# Patient Record
Sex: MALE | Race: WHITE | NOT HISPANIC OR LATINO
[De-identification: names, ages, dates, MRNs, and addresses within clinical notes are randomized per-mention and may not be internally consistent; named-entity substitution may affect disease eponyms.]

---

## 2018-01-01 ENCOUNTER — TRANSCRIPTION ENCOUNTER (OUTPATIENT)
Age: 63
End: 2018-01-01

## 2018-12-24 ENCOUNTER — TRANSCRIPTION ENCOUNTER (OUTPATIENT)
Age: 63
End: 2018-12-24

## 2019-01-27 PROBLEM — Z00.00 ENCOUNTER FOR PREVENTIVE HEALTH EXAMINATION: Status: ACTIVE | Noted: 2019-01-27

## 2019-02-25 ENCOUNTER — APPOINTMENT (OUTPATIENT)
Dept: UROLOGY | Facility: CLINIC | Age: 64
End: 2019-02-25
Payer: COMMERCIAL

## 2019-02-25 VITALS
HEIGHT: 69 IN | BODY MASS INDEX: 38.51 KG/M2 | SYSTOLIC BLOOD PRESSURE: 133 MMHG | WEIGHT: 260 LBS | DIASTOLIC BLOOD PRESSURE: 85 MMHG | HEART RATE: 101 BPM

## 2019-02-25 DIAGNOSIS — Z78.9 OTHER SPECIFIED HEALTH STATUS: ICD-10-CM

## 2019-02-25 DIAGNOSIS — Z80.42 FAMILY HISTORY OF MALIGNANT NEOPLASM OF PROSTATE: ICD-10-CM

## 2019-02-25 DIAGNOSIS — I10 ESSENTIAL (PRIMARY) HYPERTENSION: ICD-10-CM

## 2019-02-25 PROCEDURE — 99203 OFFICE O/P NEW LOW 30 MIN: CPT

## 2019-02-25 NOTE — HISTORY OF PRESENT ILLNESS
[Nocturia] : nocturia [Weak Stream] : weak stream [Erectile Dysfunction] : Erectile Dysfunction [None] : None [FreeTextEntry1] : Vinnie is a 64-year-old male, with a history of elevated PSA, status post prostate biopsy 5 years ago, that as per patient was benign. He also has a history of renal stones treated in the past the shockwave lithotripsy, who was sent for consultation has he has developed worsening urinary symptoms.\par \par He states that  he has been experiencing two or more episodes of nocturia per night with decreased force of urinary stream x4 years. He has a history of sleep apnea and understands how this could contribute to his nocturia. He denies frequency/urgency throughout the day.\par \par Additionally, he is complaining of erectile dysfunction with difficulty obtaining/maintaining an erection, worsening over the past year. He is also experiencing decreased libido/energy.\par He took a Viagra, which [place], which he states helped to achieve an erection however, it gave him a significant headache and wants to know if there is anything else he can use.\par \par Despite the history of elevated PSA status post prostate biopsy 5 years ago, which was negative for malignancy, he has not had a PSA over 2 years. Of note, he has a significant family history of prostate cancer in his father, diagnosed at age 75.\par \par He has a history of a renal stone status post ESWL 7 years ago. Denies passing any stones in the past and denies having any episodes of renal colic.\par \par His IPSS is 5, Representing mild symptoms, though he is mostly unsatisfied with the way he is voiding.\par His IIEF is 19, Representing mild erectile dysfunction.\par  [Urinary Incontinence] : no urinary incontinence [Urinary Retention] : no urinary retention [Urinary Urgency] : no urinary urgency [Urinary Frequency] : no urinary frequency [Straining] : no straining [Intermittency] : no intermittency [Post-Void Dribbling] : no post-void dribbling [Dysuria] : no dysuria [Hematuria - Gross] : no gross hematuria [Hematuria - Microscopic] : no microscopic hematuria [Bladder Spasm] : no bladder spasm [Abdominal Pain] : no abdominal pain [Flank Pain] : no flank pain

## 2019-02-25 NOTE — PHYSICAL EXAM
[General Appearance - Well Developed] : well developed [General Appearance - Well Nourished] : well nourished [Normal Appearance] : normal appearance [Well Groomed] : well groomed [General Appearance - In No Acute Distress] : no acute distress [Edema] : no peripheral edema [Respiration, Rhythm And Depth] : normal respiratory rhythm and effort [Exaggerated Use Of Accessory Muscles For Inspiration] : no accessory muscle use [Abdomen Soft] : soft [Abdomen Tenderness] : non-tender [Costovertebral Angle Tenderness] : no ~M costovertebral angle tenderness [Urethral Meatus] : meatus normal [Penis Abnormality] : normal circumcised penis [Urinary Bladder Findings] : the bladder was normal on palpation [Scrotum] : the scrotum was normal [Testes Mass (___cm)] : there were no testicular masses [Anus Abnormality] : the anus and perineum were normal [Rectal Exam - Rectum] : digital rectal exam was normal [Prostate Enlargement] : the prostate was not enlarged [Prostate Tenderness] : the prostate was not tender [Prostate Size ___ gm] : prostate size [unfilled] gm [Normal Station and Gait] : the gait and station were normal for the patient's age [] : no rash [No Focal Deficits] : no focal deficits [Oriented To Time, Place, And Person] : oriented to person, place, and time [Affect] : the affect was normal [Mood] : the mood was normal [Not Anxious] : not anxious [Femoral Lymph Nodes Enlarged Bilaterally] : femoral [Inguinal Lymph Nodes Enlarged Bilaterally] : inguinal [Heart Rate And Rhythm] : Heart rate and rhythm were normal [Auscultation Breath Sounds / Voice Sounds] : lungs were clear to auscultation bilaterally [Abdomen Hernia] : no hernia was discovered [Skin Color & Pigmentation] : normal skin color and pigmentation [FreeTextEntry1] : DTR's & BC reflexes were intact

## 2019-02-25 NOTE — LETTER HEADER
[FreeTextEntry3] : Kim Fuller M.D.\par Director of Urology\par [facility]\par [address] [place] [address], [address]\[place] [address]

## 2019-02-28 ENCOUNTER — FORM ENCOUNTER (OUTPATIENT)
Age: 64
End: 2019-02-28

## 2019-03-01 ENCOUNTER — OUTPATIENT (OUTPATIENT)
Dept: OUTPATIENT SERVICES | Facility: HOSPITAL | Age: 64
LOS: 1 days | Discharge: HOME | End: 2019-03-01

## 2019-03-01 DIAGNOSIS — N52.9 MALE ERECTILE DYSFUNCTION, UNSPECIFIED: ICD-10-CM

## 2019-03-01 DIAGNOSIS — N20.0 CALCULUS OF KIDNEY: ICD-10-CM

## 2019-03-01 DIAGNOSIS — R68.82 DECREASED LIBIDO: ICD-10-CM

## 2019-04-22 ENCOUNTER — APPOINTMENT (OUTPATIENT)
Dept: UROLOGY | Facility: CLINIC | Age: 64
End: 2019-04-22
Payer: COMMERCIAL

## 2019-04-22 DIAGNOSIS — N20.0 CALCULUS OF KIDNEY: ICD-10-CM

## 2019-04-22 DIAGNOSIS — R97.20 BENIGN PROSTATIC HYPERPLASIA WITHOUT LOWER URINARY TRACT SYMPMS: ICD-10-CM

## 2019-04-22 DIAGNOSIS — N40.0 BENIGN PROSTATIC HYPERPLASIA WITHOUT LOWER URINARY TRACT SYMPMS: ICD-10-CM

## 2019-04-22 LAB
BILIRUB UR QL STRIP: NORMAL
CLARITY UR: CLEAR
COLLECTION METHOD: NORMAL
GLUCOSE UR-MCNC: NORMAL
HCG UR QL: NORMAL EU/DL
HGB UR QL STRIP.AUTO: NORMAL
KETONES UR-MCNC: NORMAL
LEUKOCYTE ESTERASE UR QL STRIP: NORMAL
NITRITE UR QL STRIP: NORMAL
PH UR STRIP: 7
PROT UR STRIP-MCNC: NORMAL
SP GR UR STRIP: 1.01

## 2019-04-22 PROCEDURE — 51741 ELECTRO-UROFLOWMETRY FIRST: CPT

## 2019-04-22 PROCEDURE — 81003 URINALYSIS AUTO W/O SCOPE: CPT | Mod: QW

## 2019-04-22 PROCEDURE — 99214 OFFICE O/P EST MOD 30 MIN: CPT | Mod: 25

## 2019-04-22 PROCEDURE — 51798 US URINE CAPACITY MEASURE: CPT

## 2019-04-22 NOTE — ASSESSMENT
[FreeTextEntry1] : With respect to his ED low libido he did not get the [place] criteria yet. Laboratory studies showed normal liver function test. The hemoglobin was 14.6 with the platelet count of 230 5K. The testosterone total was okay at 418 but that’s due to an upper normal sex hormone binding globulin at 53 with the free and bioavailable testosterone were quite low at 37.1 and 69.8. The prolactin was okay at 6.5 and the lab did not run LH or estradiol. The urine analysis was negative with a pH of seven is to the gravity of 1.020 the urine culture showed less than 10,000 CFU consistent with skin contaminant.\par \par With respect to his hormones were going to have to repeat it as [employer] stone accept just one value and I think that’s appropriate. What we run at this time he will get an estradiol and an LH. He will need his [place] criteria classification before we can help his erections but if his testosterone is indeed low and it depends on his estradiol then we may be able to treated with aromatase inhibitors or he may need testosterone bees via Clomid or exogenous testosterone. If we make him eugonadal and his erections don’t return then if his [place] criteria classification is equal to “N” we will discuss PDE five inhibitors or other means of regaining erections.\par \par With respect to his voiding it appears to be obstructed, I will have him start on Flomax watching for the various side effects including but not limited to asthenia, and ejaculation, migraines etc. He will wait six weeks staying on the medication repeat the record of his intake and output and then come in for review possibly a repeat flow study\par Finally with respect to his stone disease or history thereof there is no history of the stone or obstruction in either kidney\par

## 2019-04-22 NOTE — PHYSICAL EXAM
[General Appearance - Well Developed] : well developed [General Appearance - Well Nourished] : well nourished [Normal Appearance] : normal appearance [Well Groomed] : well groomed [General Appearance - In No Acute Distress] : no acute distress [FreeTextEntry1] : obesity [] : no respiratory distress [Respiration, Rhythm And Depth] : normal respiratory rhythm and effort [Exaggerated Use Of Accessory Muscles For Inspiration] : no accessory muscle use [Oriented To Time, Place, And Person] : oriented to person, place, and time [Affect] : the affect was normal [Mood] : the mood was normal [Not Anxious] : not anxious [Normal Station and Gait] : the gait and station were normal for the patient's age

## 2019-04-22 NOTE — LETTER HEADER
[FreeTextEntry3] : Kim Fuller M.D.\par Director of Urology\par [facility]\par [address] [place] [address], [address]\[place] [address]

## 2019-04-22 NOTE — REVIEW OF SYSTEMS
[see HPI] : see HPI [Joint Pain] : joint pain [Erectile Dysfunction] : erectile dysfunction [Negative] : Heme/Lymph

## 2019-04-22 NOTE — LETTER BODY
[Dear  ___] : Dear  [unfilled], [Courtesy Letter:] : I had the pleasure of seeing your patient, [unfilled], in my office today. [Please see my note below.] : Please see my note below. [Sincerely,] : Sincerely, [FreeTextEntry2] : Narayan Toussaint M.D.\par [address]\[place] [address]\par

## 2019-04-22 NOTE — HISTORY OF PRESENT ILLNESS
[Urinary Urgency] : urinary urgency [Urinary Frequency] : urinary frequency [Nocturia] : nocturia [Weak Stream] : weak stream [Erectile Dysfunction] : Erectile Dysfunction [FreeTextEntry1] : Vinnie was seen on February 25 of the history as above. We did not know if his sleep apnea was affecting his urination so he asked him to keep a record of his intake and output, we sent urine off for analysis, we sent him for a renal and bladder ultrasound we will consider a flow study.\par \par With respect to his erectile dysfunction and PSA but decided to get hormones study and to get [place] criteria classification. Depending on the results we would consider what to do further with respect to that. With respect to the PSA we said that we would repeatable we can the hormone studies and then we can decide whether anything needs to be done.\par \par He came in today for review.\par

## 2019-06-24 ENCOUNTER — APPOINTMENT (OUTPATIENT)
Dept: UROLOGY | Facility: CLINIC | Age: 64
End: 2019-06-24
Payer: COMMERCIAL

## 2019-06-24 DIAGNOSIS — R68.82 DECREASED LIBIDO: ICD-10-CM

## 2019-06-24 DIAGNOSIS — R79.89 OTHER SPECIFIED ABNORMAL FINDINGS OF BLOOD CHEMISTRY: ICD-10-CM

## 2019-06-24 DIAGNOSIS — N52.9 MALE ERECTILE DYSFUNCTION, UNSPECIFIED: ICD-10-CM

## 2019-06-24 DIAGNOSIS — R35.1 NOCTURIA: ICD-10-CM

## 2019-06-24 PROCEDURE — 99214 OFFICE O/P EST MOD 30 MIN: CPT | Mod: 25

## 2019-06-24 PROCEDURE — 51741 ELECTRO-UROFLOWMETRY FIRST: CPT

## 2019-06-24 PROCEDURE — 51798 US URINE CAPACITY MEASURE: CPT

## 2019-06-24 NOTE — ASSESSMENT
[FreeTextEntry1] : He says he is voiding well on tamsulosin, and his parameters have improved. He is electing to continue. Prostate is 38 G and he does nto qualify for 4 BARBRA at this time. He is not interested in minimally invasive prostate procedures at this time. We will continue observation.\par \par Concerning his erectile dysfunction, his hormones are indeed low after 2 consecutive blood work, and he is electing to begin AndroGel 2 pumps per day. He will get blood work in 3 weeks and follow up in 4 weeks. Risks benefits and alternatives were discussed at length including ways to avoid transference\par \par He will see cardiology for further clarification of his [place] criteria.\par

## 2019-06-24 NOTE — LETTER BODY
[Dear  ___] : Dear  [unfilled], [Courtesy Letter:] : I had the pleasure of seeing your patient, [unfilled], in my office today. [Please see my note below.] : Please see my note below. [Sincerely,] : Sincerely, [FreeTextEntry2] : Narayan Toussaint M.D.\par [address]\[place] [address]\par

## 2019-06-24 NOTE — PHYSICAL EXAM
[Edema] : no peripheral edema [Abdomen Soft] : soft [Abdomen Tenderness] : non-tender [Costovertebral Angle Tenderness] : no ~M costovertebral angle tenderness [No Focal Deficits] : no focal deficits [General Appearance - Well Developed] : well developed [General Appearance - Well Nourished] : well nourished [Normal Appearance] : normal appearance [Well Groomed] : well groomed [General Appearance - In No Acute Distress] : no acute distress [FreeTextEntry1] : obesity [] : no respiratory distress [Respiration, Rhythm And Depth] : normal respiratory rhythm and effort [Exaggerated Use Of Accessory Muscles For Inspiration] : no accessory muscle use [Oriented To Time, Place, And Person] : oriented to person, place, and time [Affect] : the affect was normal [Mood] : the mood was normal [Not Anxious] : not anxious [Normal Station and Gait] : the gait and station were normal for the patient's age

## 2019-06-24 NOTE — HISTORY OF PRESENT ILLNESS
[FreeTextEntry1] : Vinnie is a 64-year-old male who had been following for ED, decreased testosterone levels, and minimally bothersome voiding symptoms.\par \par Has last visit, his prostate measured 38 g per ultrasound and his voiding diary and a uroflow study suggested poor flow. We recommended starting tamsulosin p.o. q. daily. He reports improvement in his voiding symptoms and is happy with this medication. He presents today to review his repeat voiding diary and possible uroflow study on tamsulosin.\par \par Concerning his erectile dysfunction, he had poor hormone he elected to repeat those values for consideration of testosterone replacement therapy. Additionally he obtained [place] criteria and brought it in for review.\par  [Urinary Urgency] : urinary urgency [Urinary Frequency] : urinary frequency [Nocturia] : nocturia [Weak Stream] : weak stream [Erectile Dysfunction] : Erectile Dysfunction

## 2019-06-24 NOTE — LETTER HEADER
[FreeTextEntry3] : Kim Fuller M.D.\par Director of Urology\par [facility]\par [address] [place] [address], [address]\[place] [address]

## 2019-06-24 NOTE — LETTER HEADER
[FreeTextEntry3] : Kim Fuller M.D.\par Director of Urology\par [facility]\par [address] [place] [address], [address]\[place] [address]

## 2019-06-24 NOTE — HISTORY OF PRESENT ILLNESS
[FreeTextEntry1] : Vinnie is a 64-year-old male who had been following for ED, decreased testosterone levels, and minimally bothersome voiding symptoms.\par \par Has last visit, his prostate measured 38 g per ultrasound and his voiding diary and a uroflow study suggested poor flow. We recommended starting tamsulosin p.o. q. daily. He reports improvement in his voiding symptoms and is happy with this medication. He presents today to review his repeat voiding diary and possible uroflow study on tamsulosin.\par \par Concerning his erectile dysfunction, he had poor hormone he elected to repeat those values for consideration of testosterone replacement therapy. Additionally he obtained [place] criteria and brought it in for review.\par  [Urinary Urgency] : urinary urgency [Urinary Frequency] : urinary frequency [Nocturia] : nocturia [Weak Stream] : weak stream [Erectile Dysfunction] : Erectile Dysfunction

## 2019-06-24 NOTE — LETTER BODY
[Dear  ___] : Dear  [unfilled], [Courtesy Letter:] : I had the pleasure of seeing your patient, [unfilled], in my office today. [Please see my note below.] : Please see my note below. [Sincerely,] : Sincerely, [FreeTextEntry2] : Narayan Toussaint M.D.\par [address]\[place] [address]\par

## 2019-06-24 NOTE — PHYSICAL EXAM
[Edema] : no peripheral edema [Abdomen Tenderness] : non-tender [Abdomen Soft] : soft [Costovertebral Angle Tenderness] : no ~M costovertebral angle tenderness [No Focal Deficits] : no focal deficits [General Appearance - Well Developed] : well developed [General Appearance - Well Nourished] : well nourished [Normal Appearance] : normal appearance [Well Groomed] : well groomed [General Appearance - In No Acute Distress] : no acute distress [FreeTextEntry1] : obesity [] : no respiratory distress [Respiration, Rhythm And Depth] : normal respiratory rhythm and effort [Exaggerated Use Of Accessory Muscles For Inspiration] : no accessory muscle use [Oriented To Time, Place, And Person] : oriented to person, place, and time [Affect] : the affect was normal [Mood] : the mood was normal [Not Anxious] : not anxious [Normal Station and Gait] : the gait and station were normal for the patient's age

## 2019-06-24 NOTE — ASSESSMENT
[FreeTextEntry1] : He says he is voiding well on tamsulosin, and his parameters have improved. He is electing to continue. Prostate is 38 G and he does nto qualify for 4 BARBRA at this time. He is not interested in minimally invasive prostate procedures at this time. We will continue observation.\par \par Concerning his erectile dysfunction, his hormones are indeed low after 2 consecutive blood work, and he is electing to begin AndroGel 2 pumps per day. He will get blood work in 3 weeks and follow up in 4 weeks. Risks benefits and alternatives were discussed at length including ways to avoid transference\par \par He will see cardiology for further clarification of his [place] criteria.\par

## 2019-07-10 ENCOUNTER — OUTPATIENT (OUTPATIENT)
Dept: OUTPATIENT SERVICES | Facility: HOSPITAL | Age: 64
LOS: 1 days | Discharge: HOME | End: 2019-07-10
Payer: COMMERCIAL

## 2019-07-10 VITALS
HEIGHT: 69 IN | RESPIRATION RATE: 16 BRPM | SYSTOLIC BLOOD PRESSURE: 114 MMHG | DIASTOLIC BLOOD PRESSURE: 77 MMHG | HEART RATE: 80 BPM | TEMPERATURE: 98 F | WEIGHT: 233.91 LBS | OXYGEN SATURATION: 98 %

## 2019-07-10 DIAGNOSIS — Z01.818 ENCOUNTER FOR OTHER PREPROCEDURAL EXAMINATION: ICD-10-CM

## 2019-07-10 DIAGNOSIS — Z98.1 ARTHRODESIS STATUS: Chronic | ICD-10-CM

## 2019-07-10 DIAGNOSIS — Z98.890 OTHER SPECIFIED POSTPROCEDURAL STATES: Chronic | ICD-10-CM

## 2019-07-10 DIAGNOSIS — S83.241D OTHER TEAR OF MEDIAL MENISCUS, CURRENT INJURY, RIGHT KNEE, SUBSEQUENT ENCOUNTER: ICD-10-CM

## 2019-07-10 LAB
ALBUMIN SERPL ELPH-MCNC: 4 G/DL — SIGNIFICANT CHANGE UP (ref 3.5–5.2)
ALP SERPL-CCNC: 78 U/L — SIGNIFICANT CHANGE UP (ref 30–115)
ALT FLD-CCNC: 18 U/L — SIGNIFICANT CHANGE UP (ref 0–41)
ANION GAP SERPL CALC-SCNC: 11 MMOL/L — SIGNIFICANT CHANGE UP (ref 7–14)
AST SERPL-CCNC: 16 U/L — SIGNIFICANT CHANGE UP (ref 0–41)
BILIRUB SERPL-MCNC: 0.5 MG/DL — SIGNIFICANT CHANGE UP (ref 0.2–1.2)
BUN SERPL-MCNC: 25 MG/DL — HIGH (ref 10–20)
CALCIUM SERPL-MCNC: 9.7 MG/DL — SIGNIFICANT CHANGE UP (ref 8.5–10.1)
CHLORIDE SERPL-SCNC: 99 MMOL/L — SIGNIFICANT CHANGE UP (ref 98–110)
CO2 SERPL-SCNC: 30 MMOL/L — SIGNIFICANT CHANGE UP (ref 17–32)
CREAT SERPL-MCNC: 1.3 MG/DL — SIGNIFICANT CHANGE UP (ref 0.7–1.5)
GLUCOSE SERPL-MCNC: 102 MG/DL — HIGH (ref 70–99)
HCT VFR BLD CALC: 43.9 % — SIGNIFICANT CHANGE UP (ref 42–52)
HGB BLD-MCNC: 14.4 G/DL — SIGNIFICANT CHANGE UP (ref 14–18)
MCHC RBC-ENTMCNC: 30.2 PG — SIGNIFICANT CHANGE UP (ref 27–31)
MCHC RBC-ENTMCNC: 32.8 G/DL — SIGNIFICANT CHANGE UP (ref 32–37)
MCV RBC AUTO: 92 FL — SIGNIFICANT CHANGE UP (ref 80–94)
NRBC # BLD: 0 /100 WBCS — SIGNIFICANT CHANGE UP (ref 0–0)
PLATELET # BLD AUTO: 257 K/UL — SIGNIFICANT CHANGE UP (ref 130–400)
POTASSIUM SERPL-MCNC: 3.6 MMOL/L — SIGNIFICANT CHANGE UP (ref 3.5–5)
POTASSIUM SERPL-SCNC: 3.6 MMOL/L — SIGNIFICANT CHANGE UP (ref 3.5–5)
PROT SERPL-MCNC: 7.1 G/DL — SIGNIFICANT CHANGE UP (ref 6–8)
RBC # BLD: 4.77 M/UL — SIGNIFICANT CHANGE UP (ref 4.7–6.1)
RBC # FLD: 13.3 % — SIGNIFICANT CHANGE UP (ref 11.5–14.5)
SODIUM SERPL-SCNC: 140 MMOL/L — SIGNIFICANT CHANGE UP (ref 135–146)
WBC # BLD: 8.97 K/UL — SIGNIFICANT CHANGE UP (ref 4.8–10.8)
WBC # FLD AUTO: 8.97 K/UL — SIGNIFICANT CHANGE UP (ref 4.8–10.8)

## 2019-07-10 PROCEDURE — 93010 ELECTROCARDIOGRAM REPORT: CPT

## 2019-07-10 NOTE — H&P PST ADULT - NSICDXPASTMEDICALHX_GEN_ALL_CORE_FT
PAST MEDICAL HISTORY:  BPH (benign prostatic hyperplasia)     HTN (hypertension)     OA (osteoarthritis)     Obesity     Vertigo

## 2019-07-10 NOTE — H&P PST ADULT - NSANTHOSAYNRD_GEN_A_CORE
No. STACIA screening performed.  STOP BANG Legend: 0-2 = LOW Risk; 3-4 = INTERMEDIATE Risk; 5-8 = HIGH Risk

## 2019-07-18 NOTE — ASU PATIENT PROFILE, ADULT - PREVIOUS BLOOD TRANSFUSION?
Taking Your Blood Pressure  Blood pressure is the force of blood against the artery wall as it moves from the heart through the blood vessels. You can take your own blood pressure reading using a digital monitor. Take your readings the same each time, using the same arm. Take readings as often as your healthcare provider instructs.  About blood pressure monitors  Blood pressure monitors are designed for certain ages and cases. You can find monitors for older adults, for pregnant women, and for children. Make sure the one you choose is the right one for your age and situation.  The American Heart Association recommends an automatic cuff monitor that fits on your upper arm (bicep). The cuff should fit your arm size. A cuff thats too large or too small will not give an accurate reading. Measure around your upper arm to find your size.  Monitors that attach to your finger or wrist are not as accurate as monitors for your upper arm.  Ask your healthcare provider for help in choosing a monitor. Bring your monitor to your next provider visit if you need help in using it the correct way.  The steps below are general instructions for using an automatic digital monitor.  Step 1. Relax    · Take your blood pressure at the same time every day, such as in the morning or evening, or at the time your healthcare provider recommends.  · Wait at least a half-hour after smoking, eating, or exercising. Don't drink coffee, tea, soda, or other caffeinated beverages before checking your blood pressure.  · Sit comfortably at a table with both feet on the floor. Do not cross your legs or feet. Place the monitor near you.  · Rest for a few minutes before you begin.  Step 2. Wrap the cuff    · Place your arm on the table, palm up. Your arm should be at the level of your heart. Wrap the cuff around your upper arm, just above your elbow. Its best done on bare skin, not over clothing. Most cuffs will indicate where the brachial artery (the  blood vessel in the middle of the arm at the inner side of the elbow) should line up with the cuff. Look in your monitor's instruction booklet for an illustration. You can also bring your cuff to your healthcare provider and have them show you how to correctly place the cuff.  Step 3. Inflate the cuff    · Push the button that starts the pump.  · The cuff will tighten, then loosen.  · The numbers will change. When they stop changing, your blood pressure reading will appear.  · Take 2 or 3 readings one minute apart.  Step 4. Write down the results of each reading    · Write down your blood pressure numbers for each reading. Note the date and time. Keep your results in one place, such as a notebook. Even if your monitor has a built-in memory, keep a hard copy of the readings.  · Remove the cuff from your arm. Turn off the machine.  · Bring your blood pressure records with your healthcare providers at each visit.  · If you start a new blood pressure medicine, note the day you started the new medicine. Also note the day if you change the dose of your medicine. This information goes on your blood pressure recording sheet. This will help your healthcare provider monitor how well the medicine changes are working.  · Ask your healthcare provider what numbers should prompt you to call him or her. Also ask what numbers should prompt you to get help right away.  Date Last Reviewed: 11/1/2016  © 0675-7619 [employer]. [address] [place] [address], [place] [address]. All rights reserved. This information is not intended as a substitute for professional medical care. Always follow your healthcare professional's instructions.         no

## 2019-07-18 NOTE — ASU PATIENT PROFILE, ADULT - PMH
BPH (benign prostatic hyperplasia)    HTN (hypertension)    OA (osteoarthritis)    Obesity    Vertigo

## 2019-07-19 ENCOUNTER — OUTPATIENT (OUTPATIENT)
Dept: OUTPATIENT SERVICES | Facility: HOSPITAL | Age: 64
LOS: 1 days | Discharge: HOME | End: 2019-07-19
Payer: COMMERCIAL

## 2019-07-19 ENCOUNTER — RESULT REVIEW (OUTPATIENT)
Age: 64
End: 2019-07-19

## 2019-07-19 VITALS
RESPIRATION RATE: 12 BRPM | HEART RATE: 67 BPM | DIASTOLIC BLOOD PRESSURE: 87 MMHG | SYSTOLIC BLOOD PRESSURE: 135 MMHG | OXYGEN SATURATION: 97 %

## 2019-07-19 VITALS
OXYGEN SATURATION: 96 % | DIASTOLIC BLOOD PRESSURE: 81 MMHG | SYSTOLIC BLOOD PRESSURE: 140 MMHG | HEART RATE: 68 BPM | RESPIRATION RATE: 18 BRPM | TEMPERATURE: 99 F | HEIGHT: 69 IN | WEIGHT: 233.91 LBS

## 2019-07-19 DIAGNOSIS — Z98.890 OTHER SPECIFIED POSTPROCEDURAL STATES: Chronic | ICD-10-CM

## 2019-07-19 DIAGNOSIS — Z98.1 ARTHRODESIS STATUS: Chronic | ICD-10-CM

## 2019-07-19 PROCEDURE — 88304 TISSUE EXAM BY PATHOLOGIST: CPT | Mod: 26

## 2019-07-19 RX ORDER — OXYCODONE AND ACETAMINOPHEN 5; 325 MG/1; MG/1
1 TABLET ORAL ONCE
Refills: 0 | Status: DISCONTINUED | OUTPATIENT
Start: 2019-07-19 | End: 2019-07-19

## 2019-07-19 RX ORDER — HYDROMORPHONE HYDROCHLORIDE 2 MG/ML
0.5 INJECTION INTRAMUSCULAR; INTRAVENOUS; SUBCUTANEOUS ONCE
Refills: 0 | Status: DISCONTINUED | OUTPATIENT
Start: 2019-07-19 | End: 2019-07-19

## 2019-07-19 RX ORDER — SODIUM CHLORIDE 9 MG/ML
1000 INJECTION, SOLUTION INTRAVENOUS
Refills: 0 | Status: DISCONTINUED | OUTPATIENT
Start: 2019-07-19 | End: 2019-08-09

## 2019-07-19 RX ORDER — MORPHINE SULFATE 50 MG/1
2 CAPSULE, EXTENDED RELEASE ORAL
Refills: 0 | Status: DISCONTINUED | OUTPATIENT
Start: 2019-07-19 | End: 2019-07-19

## 2019-07-19 RX ORDER — ONDANSETRON 8 MG/1
4 TABLET, FILM COATED ORAL ONCE
Refills: 0 | Status: DISCONTINUED | OUTPATIENT
Start: 2019-07-19 | End: 2019-08-09

## 2019-07-19 RX ADMIN — HYDROMORPHONE HYDROCHLORIDE 0.5 MILLIGRAM(S): 2 INJECTION INTRAMUSCULAR; INTRAVENOUS; SUBCUTANEOUS at 08:46

## 2019-07-19 RX ADMIN — HYDROMORPHONE HYDROCHLORIDE 0.5 MILLIGRAM(S): 2 INJECTION INTRAMUSCULAR; INTRAVENOUS; SUBCUTANEOUS at 09:28

## 2019-07-19 RX ADMIN — SODIUM CHLORIDE 100 MILLILITER(S): 9 INJECTION, SOLUTION INTRAVENOUS at 08:44

## 2019-07-19 NOTE — CHART NOTE - NSCHARTNOTEFT_GEN_A_CORE
PACU ANESTHESIA ADMISSION NOTE      Procedure: Arthroscopic surgical removal of medial meniscus of right knee    Post op diagnosis:  Medial meniscus tear    ____  Intubated  TV:______       Rate: ______      FiO2: ______    ___x_  Patent Airway    ____x  Full return of protective reflexes    __x__  Full recovery from anesthesia / back to baseline     Vitals:   T:   97        R:        15          BP:  129/72                Sat:   100                P: 67      Mental Status:  ___x_ Awake   _____ Alert   _____ Drowsy   _____ Sedated    Nausea/Vomiting:  __x__ NO  ______Yes,   See Post - Op Orders          Pain Scale (0-10):  _____    Treatment: ___x_ None    ____ See Post - Op/PCA Orders    Post - Operative Fluids:   ____ Oral   __x__ See Post - Op Orders    Plan: Discharge:   ___x  _Home       _____Floor     _____Critical Care    _____  Other:_________________    Comments:

## 2019-07-19 NOTE — BRIEF OPERATIVE NOTE - NSICDXBRIEFPOSTOP_GEN_ALL_CORE_FT
POST-OP DIAGNOSIS:  Medial meniscus, posterior horn derangement 19-Jul-2019 08:22:44  Bassam Samayoa

## 2019-07-19 NOTE — ASU DISCHARGE PLAN (ADULT/PEDIATRIC) - CARE PROVIDER_API CALL
Shashank Holliday (MD)  Orthopaedic Surgery  [address] [place] [address]  Phone: [phone]  Fax: [phone]  Follow Up Time: 1 week

## 2019-07-19 NOTE — BRIEF OPERATIVE NOTE - NSICDXBRIEFPREOP_GEN_ALL_CORE_FT
PRE-OP DIAGNOSIS:  Derangement of posterior horn of medial meniscus, right 19-Jul-2019 08:22:28  Bassam Samayoa

## 2019-07-19 NOTE — ASU DISCHARGE PLAN (ADULT/PEDIATRIC) - CALL YOUR DOCTOR IF YOU HAVE ANY OF THE FOLLOWING:
Wound/Surgical Site with redness, or foul smelling discharge or pus/Nausea and vomiting that does not stop

## 2019-07-22 DIAGNOSIS — M23.231 DERANGEMENT OF OTHER MEDIAL MENISCUS DUE TO OLD TEAR OR INJURY, RIGHT KNEE: ICD-10-CM

## 2019-07-22 LAB — SURGICAL PATHOLOGY STUDY: SIGNIFICANT CHANGE UP

## 2019-07-25 ENCOUNTER — APPOINTMENT (OUTPATIENT)
Dept: UROLOGY | Facility: CLINIC | Age: 64
End: 2019-07-25

## 2022-06-15 NOTE — H&P PST ADULT - HISTORY OF PRESENT ILLNESS
· 64 year ols male here for right knee surgery, states " it just crept up on me and got worse and worse"\  fos=1 limited by knee  denies chest pain sob palp  denies recent uri or uti

## 2022-10-25 NOTE — ASU PATIENT PROFILE, ADULT - PAIN, CHRONIC: FACTORS THAT AGGRAVATE, PROFILE
----- Message from Ilsa JENKINS  79  sent at 10/25/2022  1:19 PM EDT -----  Please let patient know that his Renal US is unchanged when compared to prior imaging studies  There is scarring throughout the left kidney consistent with his partial left nephrectomy  Patient should be scheduled for follow-up as he has not been seen in 2 years and has cancelled his most recent appointments  activity

## 2022-12-23 PROBLEM — R42 DIZZINESS AND GIDDINESS: Chronic | Status: ACTIVE | Noted: 2019-07-10

## 2022-12-23 PROBLEM — I10 ESSENTIAL (PRIMARY) HYPERTENSION: Chronic | Status: ACTIVE | Noted: 2019-07-10

## 2022-12-23 PROBLEM — M19.90 UNSPECIFIED OSTEOARTHRITIS, UNSPECIFIED SITE: Chronic | Status: ACTIVE | Noted: 2019-07-10

## 2022-12-23 PROBLEM — E66.9 OBESITY, UNSPECIFIED: Chronic | Status: ACTIVE | Noted: 2019-07-10

## 2022-12-23 PROBLEM — N40.0 BENIGN PROSTATIC HYPERPLASIA WITHOUT LOWER URINARY TRACT SYMPTOMS: Chronic | Status: ACTIVE | Noted: 2019-07-10

## 2022-12-28 ENCOUNTER — OUTPATIENT (OUTPATIENT)
Dept: OUTPATIENT SERVICES | Facility: HOSPITAL | Age: 67
LOS: 1 days | Discharge: HOME | End: 2022-12-28
Payer: MEDICARE

## 2022-12-28 ENCOUNTER — TRANSCRIPTION ENCOUNTER (OUTPATIENT)
Age: 67
End: 2022-12-28

## 2022-12-28 VITALS
WEIGHT: 229.94 LBS | HEIGHT: 69 IN | SYSTOLIC BLOOD PRESSURE: 118 MMHG | HEART RATE: 103 BPM | RESPIRATION RATE: 20 BRPM | TEMPERATURE: 98 F | DIASTOLIC BLOOD PRESSURE: 82 MMHG

## 2022-12-28 VITALS
DIASTOLIC BLOOD PRESSURE: 66 MMHG | HEART RATE: 88 BPM | SYSTOLIC BLOOD PRESSURE: 141 MMHG | RESPIRATION RATE: 18 BRPM | OXYGEN SATURATION: 95 %

## 2022-12-28 DIAGNOSIS — Z98.1 ARTHRODESIS STATUS: Chronic | ICD-10-CM

## 2022-12-28 DIAGNOSIS — Z98.890 OTHER SPECIFIED POSTPROCEDURAL STATES: Chronic | ICD-10-CM

## 2022-12-28 PROCEDURE — 88305 TISSUE EXAM BY PATHOLOGIST: CPT | Mod: 26

## 2022-12-28 PROCEDURE — 88312 SPECIAL STAINS GROUP 1: CPT | Mod: 26

## 2022-12-28 RX ORDER — MULTIVIT-MIN/FERROUS GLUCONATE 9 MG/15 ML
1 LIQUID (ML) ORAL
Qty: 0 | Refills: 0 | DISCHARGE

## 2022-12-28 RX ORDER — PANTOPRAZOLE SODIUM 20 MG/1
0 TABLET, DELAYED RELEASE ORAL
Qty: 0 | Refills: 0 | DISCHARGE

## 2022-12-28 RX ORDER — HYDROCHLOROTHIAZIDE 25 MG
1 TABLET ORAL
Qty: 0 | Refills: 0 | DISCHARGE

## 2022-12-28 RX ORDER — LOSARTAN/HYDROCHLOROTHIAZIDE 100MG-25MG
1 TABLET ORAL
Qty: 0 | Refills: 0 | DISCHARGE

## 2022-12-28 NOTE — CHART NOTE - NSCHARTNOTEFT_GEN_A_CORE
PACU ANESTHESIA ADMISSION NOTE      Procedure:   Post op diagnosis:      ____  Intubated  TV:______       Rate: ______      FiO2: ______    ____  Patent Airway    ____  Full return of protective reflexes    ___x_  Full recovery from anesthesia / back to baseline     Vitals:   T:   97.1        R:    18              BP:    120/76              Sat:      95             P: 97%      Mental Status:  ___x_ Awake   _____ Alert   _____ Drowsy   _____ Sedated    Nausea/Vomiting:  _x___ NO  ______Yes,   See Post - Op Orders          Pain Scale (0-10):  ___0__    Treatment: ____ None    ____ See Post - Op/PCA Orders    Post - Operative Fluids:   ____ Oral   _x___ See Post - Op Orders    Plan: Discharge:   _x___Home       _____Floor     _____Critical Care    _____  Other:_________________    Comments: PACU ANESTHESIA ADMISSION NOTE      Procedure: Upper Endoscopy  Post op diagnosis:  Gastritis    ____  Intubated  TV:______       Rate: ______      FiO2: ______    ____  Patent Airway    ____  Full return of protective reflexes    ___x_  Full recovery from anesthesia / back to baseline     Vitals:   T:   97.1        R:    18              BP:    120/76              Sat:      95             P: 97%      Mental Status:  ___x_ Awake   _____ Alert   _____ Drowsy   _____ Sedated    Nausea/Vomiting:  _x___ NO  ______Yes,   See Post - Op Orders          Pain Scale (0-10):  ___0__    Treatment: ____ None    ____ See Post - Op/PCA Orders    Post - Operative Fluids:   ____ Oral   _x___ See Post - Op Orders    Plan: Discharge:   _x___Home       _____Floor     _____Critical Care    _____  Other:_________________    Comments:

## 2022-12-28 NOTE — ASU DISCHARGE PLAN (ADULT/PEDIATRIC) - NS MD DC FALL RISK RISK
For information on Fall & Injury Prevention, visit: [email or web address] OR  [email or web address] OR  https://www.cdc.gov/steadi/patient.html

## 2022-12-28 NOTE — ASU PATIENT PROFILE, ADULT - NSICDXPASTMEDICALHX_GEN_ALL_CORE_FT
PAST MEDICAL HISTORY:  Back pain     BPH (benign prostatic hyperplasia)     HTN (hypertension)     OA (osteoarthritis)     Obesity     Vertigo

## 2022-12-28 NOTE — ASU PATIENT PROFILE, ADULT - MEDICATIONS BROUGHT TO HOSPITAL, PROFILE
PRINCIPAL DISCHARGE DIAGNOSIS  Diagnosis: Acute gout of left hand, unspecified cause  Assessment and Plan of Treatment: You presented with worsening pain of your left hand with swelling and decreased strength of the left hand. Your Xray shows inflammatory arthritis type changes consistent with your history of rheumatoid arthritis, and on exam you left hand was swollen and appeared to be warm to touch. Given your kidney dysfunction we cannot give you any NSAIDs, allopurinol, or colchicine but we started you on steroids. You felt better after your steroids. We believe that your flare was most likely in the setting of you eating red meat this past week, please continue to be careful about your diet and try to limit alcohol and red meats, high salt snacks and foods to limit your gout flares. Continue to take your prednisone as written. Please be careful with regards to the prednisone, it can cause your stomach to be upset, it can cause high blood pressure, it can cause some swelling of the legs as well. If you are experiencing any of these symptoms please call your PCP or if they are causing you great distress like shortness of breath, nausea, vomiting, fevers, unremitting abdominal pain, dark colored stools or bright red colored stools please come to the hospital as these may be signs that you require urgent re-evaluation.      SECONDARY DISCHARGE DIAGNOSES  Diagnosis: Acute kidney injury  Assessment and Plan of Treatment: Your kidney function over the past couple of months has gotten progressively worse. Your nephrologist (kidney) doctor wanted for you to get a renal biopsy as an outpatient to determine why your renal function is getting worse. Here in the hospital your renal function has been stable and we are still planning on you getting  a renal biopsy as an outpatient as well and ask that you hold your aspirin until the procedure, and once the procedure is done you can restart your aspirin. In addition, continue taking your eliquis for now and when advised by your nephrologists please stop taking your eliquis for the potential renal biopsy.     yes

## 2022-12-28 NOTE — PRE-ANESTHESIA EVALUATION ADULT - NSANTHBMIRD_ENT_A_CORE
Medicare Wellness Visit  Plan for Preventive Care    A good way for you to stay healthy is to use preventive care.  Medicare covers many services that can help you stay healthy.* The goal of these services is to find any health problems as quickly as possible. Finding problems early can help make them easier to treat.  Your personal plan below lists the services you may need and when they are due.     Health Maintenance Summary     Shingles Vaccine (1 of 2)  Overdue since 3/11/2002    Influenza Vaccine (1)  Overdue since 9/1/2019    Colorectal Cancer Screening-Colonoscopy (Every 5 Years)  Next due on 4/17/2020    Medicare Wellness 65+ (Yearly)  Next due on 10/10/2020    Depression Screening (Yearly)  Next due on 10/10/2020    DTaP/Tdap/Td Vaccine (2 - Td)  Next due on 3/20/2022    Abdominal Aortic Aneurysm (AAA) Screening   Completed    Hepatitis C Screening   Completed    Pneumococcal Vaccine 65+   Completed    Meningococcal Vaccine   Aged Out           Preventive Care for Women and Men    Heart Screenings (Cardiovascular):  · Blood tests are used to check your cholesterol, lipid and triglyceride levels. High levels can increase your risk for heart disease and stroke. High levels can be treated with medications, diet and exercise. Lowering your levels can help keep your heart and blood vessels healthy.  Your provider will order these tests if they are needed.    · An ultrasound is done to see if you have an abdominal aortic aneurysm (AAA).  This is an enlargement of one of the main blood vessels that delivers blood to the body.   In the [place], 9,000 deaths are caused by AAA.  You may not even know you have this problem and as many as 1 in 3 people will have a serious problem if it is not treated.  Early diagnosis allows for more effective treatment and cure.  If you have a family history of AAA or are a male age 65-75 who has smoked, you are at higher risk of an AAA.  Your provider can order this test, if  needed.    Colorectal Screening:  · There are many tests that are used to check for cancer of your colon and rectum. You and your provider should discuss what test is best for you and when to have it done.  Options include:  · Screening Colonoscopy: exam of the entire colon, seen through a flexible lighted tube.  · Flexible Sigmoidoscopy: exam of the last third (sigmoid portion) of the colon and rectum, seen through a flexible lighted tube.  · Cologuard DNA stool test: a sample of your stool is used to screen for cancer and unseen blood in your stool.  · Fecal Occult Blood Test: a sample of your stool is studied to find any unseen blood    Flu Shot:  · An immunization that helps to prevent influenza (the flu). You should get this every year. The best time to get the shot is in the fall.    Pneumococcal Shot:  • Vaccines are available that can help prevent pneumococcal disease, which is any type of infection caused by Streptococcus pneumoniae bacteria.   Their use can prevent some cases of pneumonia, meningitis, and sepsis. There are two types of pneumococcal vaccines:   o Conjugate vaccines (PCV-13 or Prevnar 13®) - helps protect against the 13 types of pneumococcal bacteria that are the most common causes of serious infections in children and adults.    o Polysaccharide vaccine (PPSV23 or Fdfmbfbrq11®) - helps protect against 23 types of pneumococcal bacteria for patients who are recommended to get it.  These vaccines should be given at least 12 months apart.  A booster is usually not needed.     Hepatitis B Shot:  · An immunization that helps to protect people from getting Hepatitis B. Hepatitis B is a virus that spreads through contact with infected blood or body fluids. Many people with the virus do not have symptoms.  The virus can lead to serious problems, such as liver disease. Some people are at higher risk than others. Your doctor will tell you if you need this shot.     Diabetes Screening:  · A test to  measure sugar (glucose) in your blood is called a fasting blood sugar. Fasting means you cannot have food or drink for at least 8 hours before the test. This test can detect diabetes long before you may notice symptoms.    Glaucoma Screening:  · Glaucoma screening is performed by your eye doctor. The test measures the fluid pressure inside your eyes to determine if you have glaucoma.     Hepatitis C Screening:  · A blood test to see if you have the hepatitis C virus.  Hepatitis C attacks the liver and is a major cause of chronic liver disease.  Medicare will cover a single screening for all adults born between 1945 & 1965, or high risk patients (people who have injected illegal drugs or people who have had blood transfusions).  High risk patients who continue to inject illegal drugs can be screened for Hepatitis C every year.    Smoking and Tobacco-Use Cessation Counseling:  · Tobacco is the single greatest cause of disease and early death in our country today. Medication and counseling together can increase a person’s chance of quitting for good.   · Medicare covers two quitting attempts per year, with four counseling sessions per attempt (eight sessions in a 12 month period)    Preventive Screening tests for Women    Screening Mammograms and Breast Exams:  · An x-ray of your breasts to check for breast cancer before you or your doctor may be able to feel it.  If breast cancer is found early it can usually be treated with success.    Pelvic Exams and Pap Tests:  · An exam to check for cervical and vaginal cancer. A Pap test is a lab test in which cells are taken from your cervix and sent to the lab to look for signs of cervical cancer. If cancer of the cervix is found early, chances for a cure are good. Testing can generally end at age 65, or if a woman has a hysterectomy for a benign condition. Your provider may recommend more frequent testing if certain abnormal results are found.    Bone Mass Measurements:  · A  painless x-ray of your bone density to see if you are at risk for a broken bone. Bone density refers to the thickness of bones or how tightly the bone tissue is packed.    Preventive Screening tests for Men    Prostate Screening:  · Should you have a prostate cancer test (PSA)?  It is up to you to decide if you want a prostate cancer test. Talk to your clinician to find out if the test is right for you.  Things for you to consider and talk about should include:  · Benefits and harms of the test  · Your family history  · How your race/ethnicity may influence the test  · If the test may impact other medical conditions you have  · Your values on screenings and treatments    *Medicare pays for many preventive services to keep you healthy. For some of these services, you might have to pay a deductible, coinsurance, and / or copayment.  The amounts vary depending on the type of services you need and the kind of Medicare health plan you have.               Yes

## 2022-12-29 LAB — SURGICAL PATHOLOGY STUDY: SIGNIFICANT CHANGE UP

## 2023-01-02 DIAGNOSIS — I10 ESSENTIAL (PRIMARY) HYPERTENSION: ICD-10-CM

## 2023-01-02 DIAGNOSIS — K21.9 GASTRO-ESOPHAGEAL REFLUX DISEASE WITHOUT ESOPHAGITIS: ICD-10-CM

## 2023-01-02 DIAGNOSIS — K92.1 MELENA: ICD-10-CM

## 2023-01-02 DIAGNOSIS — E66.9 OBESITY, UNSPECIFIED: ICD-10-CM

## 2023-01-02 DIAGNOSIS — Z88.2 ALLERGY STATUS TO SULFONAMIDES: ICD-10-CM

## 2023-01-02 DIAGNOSIS — K29.81 DUODENITIS WITH BLEEDING: ICD-10-CM

## 2023-01-02 DIAGNOSIS — K31.89 OTHER DISEASES OF STOMACH AND DUODENUM: ICD-10-CM

## 2023-01-02 DIAGNOSIS — M19.90 UNSPECIFIED OSTEOARTHRITIS, UNSPECIFIED SITE: ICD-10-CM

## 2023-01-02 DIAGNOSIS — Z98.1 ARTHRODESIS STATUS: ICD-10-CM

## 2023-02-08 ENCOUNTER — APPOINTMENT (OUTPATIENT)
Dept: CARDIOLOGY | Facility: CLINIC | Age: 68
End: 2023-02-08

## 2023-05-23 NOTE — PRE-ANESTHESIA EVALUATION ADULT - NSPREOPDXFT_GEN_ALL_CORE
right knee torn meniscus General Sunscreen Counseling: I recommended a broad spectrum sunscreen with a SPF of 30 or higher.  I explained that SPF 30 sunscreens block approximately 97 percent of the sun's harmful rays.  Sunscreens should be applied at least 15 minutes prior to expected sun exposure and then every 2 hours after that as long as sun exposure continues. If swimming or exercising sunscreen should be reapplied every 45 minutes to an hour after getting wet or sweating.  One ounce, or the equivalent of a shot glass full of sunscreen, is adequate to protect the skin not covered by a bathing suit. I also recommended a lip balm with a sunscreen as well. Sun protective clothing can be used in lieu of sunscreen but must be worn the entire time you are exposed to the sun's rays. Detail Level: Detailed

## 2023-12-04 PROBLEM — M54.9 DORSALGIA, UNSPECIFIED: Chronic | Status: ACTIVE | Noted: 2022-12-28

## 2024-02-16 ENCOUNTER — APPOINTMENT (OUTPATIENT)
Dept: ORTHOPEDIC SURGERY | Facility: CLINIC | Age: 69
End: 2024-02-16
Payer: MEDICARE

## 2024-02-16 VITALS — WEIGHT: 257 LBS | HEIGHT: 69 IN | BODY MASS INDEX: 38.06 KG/M2

## 2024-02-16 DIAGNOSIS — M17.11 UNILATERAL PRIMARY OSTEOARTHRITIS, RIGHT KNEE: ICD-10-CM

## 2024-02-16 PROCEDURE — 73560 X-RAY EXAM OF KNEE 1 OR 2: CPT | Mod: 50

## 2024-02-16 PROCEDURE — 99203 OFFICE O/P NEW LOW 30 MIN: CPT

## 2024-02-16 PROCEDURE — 73502 X-RAY EXAM HIP UNI 2-3 VIEWS: CPT

## 2024-02-16 NOTE — HISTORY OF PRESENT ILLNESS
[de-identified] : Right hip and knee pain  History:  Pain activity related, localized to the joint, partially improved with rest. ADL's noted to be increasingly difficult without use of modifying therapy.   Medical History: Past Medical History is unchanged, all medical issues and medications are stable.  Review of systems is negative for fevers, chills, chest pain, shortness of breath, unexplained weight fluctuations, GI or  symptoms.  Smoking history and social habits are unchanged.   Exam: pain with PROM, limited by guading, no gross instability, strength normal, NVID.   Imaging: X-rays were reviewed with the patient.   AP and Lateral views were obtained of the hips, including the contralateral side for comparison purposes. X-rays are negative for acute bone or soft tissue trauma.  Imaging: X-rays were reviewed with the patient.   AP and Lateral views were obtained of the knees, including the contralateral side for comparison purposes. X-rays are negative for acute bone or soft tissue trauma. Moderate OA right knee  Impression: osteoarthritis right knee  Plan: Plan is for non op management to include rest, activity modification, therapeutic exercises, stretching program, use of nsaids and analgesics, and weight control.

## 2024-05-13 ENCOUNTER — APPOINTMENT (OUTPATIENT)
Dept: CARDIOLOGY | Facility: CLINIC | Age: 69
End: 2024-05-13
Payer: MEDICARE

## 2024-05-13 VITALS
HEART RATE: 81 BPM | BODY MASS INDEX: 37.33 KG/M2 | HEIGHT: 69 IN | WEIGHT: 252 LBS | DIASTOLIC BLOOD PRESSURE: 80 MMHG | SYSTOLIC BLOOD PRESSURE: 118 MMHG

## 2024-05-13 LAB
ANION GAP SERPL CALC-SCNC: 12 MMOL/L
BASOPHILS # BLD AUTO: 0.05 K/UL
BASOPHILS NFR BLD AUTO: 0.4 %
BUN SERPL-MCNC: 23 MG/DL
CALCIUM SERPL-MCNC: 9.3 MG/DL
CHLORIDE SERPL-SCNC: 104 MMOL/L
CHOLEST SERPL-MCNC: 209 MG/DL
CO2 SERPL-SCNC: 24 MMOL/L
CREAT SERPL-MCNC: 1.1 MG/DL
EGFR: 73 ML/MIN/1.73M2
EOSINOPHIL # BLD AUTO: 0.29 K/UL
EOSINOPHIL NFR BLD AUTO: 2.4 %
ESTIMATED AVERAGE GLUCOSE: 134 MG/DL
GLUCOSE SERPL-MCNC: 103 MG/DL
HBA1C MFR BLD HPLC: 6.3 %
HCT VFR BLD CALC: 46 %
HDLC SERPL-MCNC: 56 MG/DL
HGB BLD-MCNC: 15 G/DL
IMM GRANULOCYTES NFR BLD AUTO: 0.3 %
LDLC SERPL CALC-MCNC: 126 MG/DL
LYMPHOCYTES # BLD AUTO: 6.23 K/UL
LYMPHOCYTES NFR BLD AUTO: 51.4 %
MAN DIFF?: NORMAL
MCHC RBC-ENTMCNC: 30.4 PG
MCHC RBC-ENTMCNC: 32.6 G/DL
MCV RBC AUTO: 93.1 FL
MONOCYTES # BLD AUTO: 1.06 K/UL
MONOCYTES NFR BLD AUTO: 8.7 %
NEUTROPHILS # BLD AUTO: 4.46 K/UL
NEUTROPHILS NFR BLD AUTO: 36.8 %
NONHDLC SERPL-MCNC: 153 MG/DL
PLATELET # BLD AUTO: 221 K/UL
PMV BLD AUTO: 0 /100 WBCS
POTASSIUM SERPL-SCNC: 4.4 MMOL/L
RBC # BLD: 4.94 M/UL
RBC # FLD: 13.5 %
SODIUM SERPL-SCNC: 140 MMOL/L
TRIGL SERPL-MCNC: 134 MG/DL
TSH SERPL-ACNC: 2.5 UIU/ML
WBC # FLD AUTO: 12.13 K/UL

## 2024-05-13 PROCEDURE — 99204 OFFICE O/P NEW MOD 45 MIN: CPT

## 2024-05-13 PROCEDURE — ZZZZZ: CPT

## 2024-05-13 RX ORDER — BENZONATATE 200 MG/1
200 CAPSULE ORAL
Qty: 21 | Refills: 0 | Status: DISCONTINUED | COMMUNITY
Start: 2018-12-24 | End: 2024-05-13

## 2024-05-13 RX ORDER — IPRATROPIUM BROMIDE 42 UG/1
0.06 SPRAY NASAL
Qty: 15 | Refills: 0 | Status: DISCONTINUED | COMMUNITY
Start: 2018-12-24 | End: 2024-05-13

## 2024-05-13 RX ORDER — TESTOSTERONE 16.2 MG/G
20.25 MG/ACT GEL TRANSDERMAL
Qty: 1 | Refills: 0 | Status: DISCONTINUED | COMMUNITY
Start: 2019-06-24 | End: 2024-05-13

## 2024-05-13 RX ORDER — LOSARTAN POTASSIUM AND HYDROCHLOROTHIAZIDE 12.5; 1 MG/1; MG/1
100-12.5 TABLET ORAL DAILY
Refills: 0 | Status: ACTIVE | COMMUNITY

## 2024-05-13 RX ORDER — TAMSULOSIN HYDROCHLORIDE 0.4 MG/1
0.4 CAPSULE ORAL
Qty: 30 | Refills: 1 | Status: DISCONTINUED | COMMUNITY
Start: 2019-04-22 | End: 2024-05-13

## 2024-05-13 RX ORDER — HYDROCHLOROTHIAZIDE 25 MG/1
25 TABLET ORAL
Qty: 30 | Refills: 0 | Status: DISCONTINUED | COMMUNITY
Start: 2018-10-22 | End: 2024-05-13

## 2024-05-13 RX ORDER — MECLIZINE HYDROCHLORIDE 25 MG/1
25 TABLET ORAL
Qty: 30 | Refills: 0 | Status: DISCONTINUED | COMMUNITY
Start: 2018-10-02 | End: 2024-05-13

## 2024-05-13 NOTE — PHYSICAL EXAM
[General Appearance - Well Developed] : well developed [Normal Appearance] : normal appearance [Normal Conjunctiva] : the conjunctiva exhibited no abnormalities [Normal Oral Mucosa] : normal oral mucosa [Heart Sounds] : normal S1 and S2 [Murmurs] : no murmurs present [2+] : left 2+ [] : no respiratory distress [Exaggerated Use Of Accessory Muscles For Inspiration] : no accessory muscle use [Auscultation Breath Sounds / Voice Sounds] : lungs were clear to auscultation bilaterally [Bowel Sounds] : normal bowel sounds [Abdomen Soft] : soft [Abdomen Tenderness] : non-tender [Abnormal Walk] : normal gait [Oriented To Time, Place, And Person] : oriented to person, place, and time [Impaired Insight] : insight and judgment were intact [Affect] : the affect was normal

## 2024-05-13 NOTE — ASSESSMENT
[FreeTextEntry1] : 68 yo male patient former smoker quit 30 years ago had 10PY, hx of HTN well controlled, BPH, obese  referred by his PCP: Dr Noah Guevara, for PEREZ.  his sx started 2 years ago, he feels short of breath after 0/5 miles, with no chest pain or pressure.  he reports R>L knee pain when walking or going up the stairs 2/2 osteoarthritis, follows with ortho.  He reports that he snores a lot but doesn;t remember waking up in the middle of the night to catch his breath.  Fam hx: Father had CAD s/p CABG age of 69, prostate cancer 75 / Mother: kidney cancer age of 60s.  had labs within the last year with his PCP, will bring them next time   #PEREZ #occasional Palpitations  #HTN - controlled - vitals normal - EKG NSR with poor RWP - TTE ordered - cbc, BMP, lipid profie, A1c, TSH - CCTA - MCOT for a week  - cw home losartan 100mg qd  - weight loss advised - f/u in 1-2 months    I was physically present with the Resident/Fellow at the time of the visit. I agree with the findings, assessment and plan as written, unless noted below.

## 2024-05-15 NOTE — ASU PREOP CHECKLIST - NS PREOP CHK TRT ENDOSCOPY TIME
Patient mother calling stating that daughter saw Urologist and it was recommended that she reach out to family doctor if patient was having symptoms of a UTI and to have the doctor order a Urine culture and a urinalysis.  Codi Parks     28-Dec-2022 09:34

## 2024-06-07 ENCOUNTER — OUTPATIENT (OUTPATIENT)
Dept: OUTPATIENT SERVICES | Facility: HOSPITAL | Age: 69
LOS: 1 days | End: 2024-06-07
Payer: MEDICARE

## 2024-06-07 ENCOUNTER — RESULT REVIEW (OUTPATIENT)
Age: 69
End: 2024-06-07

## 2024-06-07 DIAGNOSIS — R06.02 SHORTNESS OF BREATH: ICD-10-CM

## 2024-06-07 DIAGNOSIS — Z98.1 ARTHRODESIS STATUS: Chronic | ICD-10-CM

## 2024-06-07 DIAGNOSIS — Z98.890 OTHER SPECIFIED POSTPROCEDURAL STATES: Chronic | ICD-10-CM

## 2024-06-07 DIAGNOSIS — Z00.8 ENCOUNTER FOR OTHER GENERAL EXAMINATION: ICD-10-CM

## 2024-06-07 PROCEDURE — 75574 CT ANGIO HRT W/3D IMAGE: CPT

## 2024-06-07 PROCEDURE — 75574 CT ANGIO HRT W/3D IMAGE: CPT | Mod: 26

## 2024-06-08 DIAGNOSIS — R06.02 SHORTNESS OF BREATH: ICD-10-CM

## 2024-06-17 ENCOUNTER — APPOINTMENT (OUTPATIENT)
Dept: CARDIOLOGY | Facility: CLINIC | Age: 69
End: 2024-06-17
Payer: MEDICARE

## 2024-06-17 VITALS
DIASTOLIC BLOOD PRESSURE: 96 MMHG | SYSTOLIC BLOOD PRESSURE: 140 MMHG | RESPIRATION RATE: 16 BRPM | HEART RATE: 87 BPM | HEIGHT: 69 IN | WEIGHT: 253 LBS | BODY MASS INDEX: 37.47 KG/M2 | OXYGEN SATURATION: 95 %

## 2024-06-17 DIAGNOSIS — R06.02 SHORTNESS OF BREATH: ICD-10-CM

## 2024-06-17 DIAGNOSIS — E66.01 MORBID (SEVERE) OBESITY DUE TO EXCESS CALORIES: ICD-10-CM

## 2024-06-17 PROCEDURE — 99214 OFFICE O/P EST MOD 30 MIN: CPT

## 2024-06-17 PROCEDURE — G2211 COMPLEX E/M VISIT ADD ON: CPT

## 2024-06-17 RX ORDER — METOPROLOL TARTRATE 100 MG/1
100 TABLET, FILM COATED ORAL
Qty: 2 | Refills: 0 | Status: DISCONTINUED | COMMUNITY
Start: 2024-05-14 | End: 2024-06-17

## 2024-06-17 NOTE — PHYSICAL EXAM
[General Appearance - Well Developed] : well developed [Heart Sounds] : normal S1 and S2 [Murmurs] : no murmurs present [Bowel Sounds] : normal bowel sounds [Normal Oral Mucosa] : normal oral mucosa [Nail Clubbing] : no clubbing of the fingernails [] : no rash [Oriented To Time, Place, And Person] : oriented to person, place, and time

## 2024-06-17 NOTE — ASSESSMENT
[FreeTextEntry1] : 68 y/o M #HTN - controlled #Non-obstructive CAD #Dyslipidemia #Pre-diabetes  - CCTA noted. Ca score zero. Non-obstructive CAD - MCOT: No arrhythmias noted. Highest  bpm (sinus tachycardia).  - TTE ordered - not done  - Labs noted: , A1C 6.4%  Plan: - C/w home losartan 100mg qd - Start ASA 81mg, and Lipitor 40mg - Repeat bloodwork and return to clinic in 6 months  - Weight loss advised   I was physically present with the fellow at the time of the visit. I agree with the findings, assessment and plan as written unless noted below.

## 2024-06-17 NOTE — HISTORY OF PRESENT ILLNESS
[FreeTextEntry1] : 70 yo male patient former smoker quit 30 years ago had 10PY, hx of HTN well controlled, BPH, obese referred by his PCP: Dr Noah Guevara, for PEREZ. his sx started 2 years ago, he feels short of breath after 0/5 miles, with no chest pain or pressure. He also reports palpitations and tachycardia to 110s sometimes. he reports R>L knee pain when walking or going up the stairs 2/2 osteoarthritis, follows with ortho. He is here for follow up following CCTA, MCOT, and labwork.   Fam hx: Father had CAD s/p CABG age of 69, prostate cancer 75 / Mother: kidney cancer age of 60s.  DATA reviewed today: CCTA:  -Nonobstructive coronary artery disease. -Noncalcified plaque in the distal LM into the ostium of the LAD, resulting in mild stenosis. -The total Agatston coronary artery calcium score equals 0.  labwork notable for , A1C 6.4%

## 2024-09-06 NOTE — ASSESSMENT
[FreeTextEntry1] : There are multiple urological issues here. \par \par Concerning his urination, it is very possible that his sleep apnea may be affecting his nighttime urination. He does admit to a decreased force of stream however, his physical examination reveals a small prostate. \par He will keep a voiding diary, he will obtain renal/bladder sonogram, and follow up for review and possibly a uroflow study.\par \par With regard to his erectile dysfunction, a full hormonal panel will be obtained, especially given the morbid obesity and the testicular atrophy found on physical examination. He has been instructed to follow up with his PCP to obtain clearance for PDE 5 inhibitors. He will follow up for review in consultation.\par \par He has history of elevated PSA and has not had a PSA 2 years. We will obtain repeat PSA. Depending upon the level we will consider observation and/or 4K score versus further evaluation with MRI, and repeat biopsy.\par \par We will also obtain a renal/bladder sonogram, secondary to his history of renal stones and bothersome lower urinary tract symptoms.\par  stated

## 2024-10-21 ENCOUNTER — NON-APPOINTMENT (OUTPATIENT)
Age: 69
End: 2024-10-21

## 2024-11-05 ENCOUNTER — NON-APPOINTMENT (OUTPATIENT)
Age: 69
End: 2024-11-05

## 2024-11-05 ENCOUNTER — APPOINTMENT (OUTPATIENT)
Dept: ORTHOPEDIC SURGERY | Facility: CLINIC | Age: 69
End: 2024-11-05
Payer: MEDICARE

## 2024-11-05 DIAGNOSIS — M17.11 UNILATERAL PRIMARY OSTEOARTHRITIS, RIGHT KNEE: ICD-10-CM

## 2024-11-05 PROCEDURE — 99213 OFFICE O/P EST LOW 20 MIN: CPT

## 2024-11-12 ENCOUNTER — APPOINTMENT (OUTPATIENT)
Dept: CARDIOLOGY | Facility: CLINIC | Age: 69
End: 2024-11-12
Payer: MEDICARE

## 2024-11-12 PROCEDURE — 93306 TTE W/DOPPLER COMPLETE: CPT

## 2024-12-16 ENCOUNTER — APPOINTMENT (OUTPATIENT)
Dept: CARDIOLOGY | Facility: CLINIC | Age: 69
End: 2024-12-16
Payer: MEDICARE

## 2024-12-16 ENCOUNTER — NON-APPOINTMENT (OUTPATIENT)
Age: 69
End: 2024-12-16

## 2024-12-16 VITALS
WEIGHT: 232 LBS | BODY MASS INDEX: 34.36 KG/M2 | DIASTOLIC BLOOD PRESSURE: 90 MMHG | HEART RATE: 94 BPM | HEIGHT: 69 IN | SYSTOLIC BLOOD PRESSURE: 120 MMHG | RESPIRATION RATE: 16 BRPM

## 2024-12-16 DIAGNOSIS — I10 ESSENTIAL (PRIMARY) HYPERTENSION: ICD-10-CM

## 2024-12-16 LAB
ALBUMIN SERPL ELPH-MCNC: 4.1 G/DL
ALP BLD-CCNC: 84 U/L
ALT SERPL-CCNC: 21 U/L
ANION GAP SERPL CALC-SCNC: 8 MMOL/L
AST SERPL-CCNC: 16 U/L
BILIRUB SERPL-MCNC: 0.6 MG/DL
BUN SERPL-MCNC: 23 MG/DL
CALCIUM SERPL-MCNC: 9.9 MG/DL
CHLORIDE SERPL-SCNC: 104 MMOL/L
CHOLEST SERPL-MCNC: 231 MG/DL
CO2 SERPL-SCNC: 28 MMOL/L
CREAT SERPL-MCNC: 1.1 MG/DL
EGFR: 73 ML/MIN/1.73M2
ESTIMATED AVERAGE GLUCOSE: 134 MG/DL
GLUCOSE SERPL-MCNC: 102 MG/DL
HBA1C MFR BLD HPLC: 6.3 %
HCT VFR BLD CALC: 45.6 %
HDLC SERPL-MCNC: 62 MG/DL
HGB BLD-MCNC: 14.7 G/DL
LDLC SERPL CALC-MCNC: 142 MG/DL
MCHC RBC-ENTMCNC: 30.3 PG
MCHC RBC-ENTMCNC: 32.2 G/DL
MCV RBC AUTO: 94 FL
NONHDLC SERPL-MCNC: 169 MG/DL
PLATELET # BLD AUTO: 246 K/UL
PMV BLD AUTO: 0 /100 WBCS
PMV BLD: 11.1 FL
POTASSIUM SERPL-SCNC: 4.3 MMOL/L
PROT SERPL-MCNC: 6.7 G/DL
RBC # BLD: 4.85 M/UL
RBC # FLD: 13.7 %
SODIUM SERPL-SCNC: 140 MMOL/L
TRIGL SERPL-MCNC: 134 MG/DL
TSH SERPL-ACNC: 2.67 UIU/ML
WBC # FLD AUTO: 12.62 K/UL

## 2024-12-16 PROCEDURE — G2211 COMPLEX E/M VISIT ADD ON: CPT

## 2024-12-16 PROCEDURE — 99214 OFFICE O/P EST MOD 30 MIN: CPT

## 2024-12-16 RX ORDER — ATORVASTATIN CALCIUM 40 MG/1
40 TABLET, FILM COATED ORAL
Qty: 90 | Refills: 1 | Status: ACTIVE | COMMUNITY
Start: 2024-12-16 | End: 1900-01-01

## 2024-12-16 RX ORDER — ASPIRIN ENTERIC COATED TABLETS 81 MG 81 MG/1
81 TABLET, DELAYED RELEASE ORAL DAILY
Qty: 90 | Refills: 1 | Status: ACTIVE | COMMUNITY
Start: 2024-12-16 | End: 1900-01-01

## 2024-12-16 RX ORDER — LOSARTAN POTASSIUM AND HYDROCHLOROTHIAZIDE 12.5; 1 MG/1; MG/1
100-12.5 TABLET ORAL DAILY
Qty: 90 | Refills: 0 | Status: ACTIVE | COMMUNITY
Start: 2024-12-16

## 2024-12-16 RX ADMIN — METOPROLOL SUCCINATE 1 MG: 50 TABLET, EXTENDED RELEASE ORAL at 00:00

## 2025-01-30 NOTE — END OF VISIT
Referral signed per patient request.   [>50% of Time Spent on Counseling for ____] : Greater than 50% of the encounter time was spent on counseling for [unfilled] [Time Spent: ___ minutes] : I have spent [unfilled] minutes of face to face time with the patient

## 2025-02-20 ENCOUNTER — APPOINTMENT (OUTPATIENT)
Dept: ORTHOPEDIC SURGERY | Facility: CLINIC | Age: 70
End: 2025-02-20
Payer: MEDICARE

## 2025-02-20 DIAGNOSIS — S69.91XA UNSPECIFIED INJURY OF RIGHT WRIST, HAND AND FINGER(S), INITIAL ENCOUNTER: ICD-10-CM

## 2025-02-20 PROCEDURE — L3908: CPT | Mod: RT

## 2025-02-20 PROCEDURE — 73130 X-RAY EXAM OF HAND: CPT | Mod: RT

## 2025-02-20 PROCEDURE — 99213 OFFICE O/P EST LOW 20 MIN: CPT | Mod: 25

## 2025-03-31 ENCOUNTER — APPOINTMENT (OUTPATIENT)
Dept: ORTHOPEDIC SURGERY | Facility: CLINIC | Age: 70
End: 2025-03-31
Payer: MEDICARE

## 2025-03-31 DIAGNOSIS — M65.342 TRIGGER FINGER, LEFT RING FINGER: ICD-10-CM

## 2025-03-31 PROCEDURE — 99203 OFFICE O/P NEW LOW 30 MIN: CPT

## 2025-05-25 ENCOUNTER — EMERGENCY (EMERGENCY)
Facility: HOSPITAL | Age: 70
LOS: 0 days | Discharge: HOME | End: 2025-05-25
Attending: EMERGENCY MEDICINE
Payer: MEDICARE

## 2025-05-25 VITALS
SYSTOLIC BLOOD PRESSURE: 126 MMHG | TEMPERATURE: 98 F | HEIGHT: 69 IN | HEART RATE: 81 BPM | RESPIRATION RATE: 20 BRPM | OXYGEN SATURATION: 95 % | DIASTOLIC BLOOD PRESSURE: 83 MMHG | WEIGHT: 246.92 LBS

## 2025-05-25 DIAGNOSIS — Z98.1 ARTHRODESIS STATUS: Chronic | ICD-10-CM

## 2025-05-25 DIAGNOSIS — Z98.890 OTHER SPECIFIED POSTPROCEDURAL STATES: Chronic | ICD-10-CM

## 2025-05-25 DIAGNOSIS — Z88.2 ALLERGY STATUS TO SULFONAMIDES: ICD-10-CM

## 2025-05-25 DIAGNOSIS — M79.672 PAIN IN LEFT FOOT: ICD-10-CM

## 2025-05-25 DIAGNOSIS — M77.8 OTHER ENTHESOPATHIES, NOT ELSEWHERE CLASSIFIED: ICD-10-CM

## 2025-05-25 PROCEDURE — 73630 X-RAY EXAM OF FOOT: CPT | Mod: 26,LT

## 2025-05-25 PROCEDURE — 99284 EMERGENCY DEPT VISIT MOD MDM: CPT

## 2025-05-25 PROCEDURE — 73630 X-RAY EXAM OF FOOT: CPT | Mod: LT

## 2025-05-25 PROCEDURE — 99283 EMERGENCY DEPT VISIT LOW MDM: CPT | Mod: 25

## 2025-05-25 NOTE — ED PROVIDER NOTE - CLINICAL SUMMARY MEDICAL DECISION MAKING FREE TEXT BOX
Patient with likely inflammation of tendon of foot.  X-ray was obtained and film was interpreted by me.  No acute fracture or dislocation was seen.  Results discussed with patient and his wife.  Recommend ice to area, patient placed in [place] shoe.  Patient's wife has an appoint with podiatrist this week.  She is going to try to get patient into her appointment as well.  Patient will return if any worsening symptoms or concerns.

## 2025-05-25 NOTE — ED PROVIDER NOTE - NSFOLLOWUPINSTRUCTIONS_ED_ALL_ED_FT
Tendinitis    Tendinitis is inflammation of a tendon. A tendon is a strong cord of tissue that connects muscle to bone. Tendinitis can affect any tendon, but it most commonly affects the shoulder tendon (rotator cuff), ankle tendon (Achilles tendon), elbow tendon (triceps tendon), or one of the tendons in the wrist.    What are the causes?  This condition may be caused by:  Overusing a tendon or muscle. This is common.  Age-related wear and tear.  Injury.  Inflammatory conditions, such as arthritis.  Certain medicines.  What increases the risk?  This condition is more likely to develop in people who do activities that involve repetitive motions.    What are the signs or symptoms?  Symptoms of this condition may include:  Pain.  Tenderness.  Mild swelling.  How is this diagnosed?  This condition is diagnosed with a physical exam. You may also have tests, such as:  Ultrasound. This uses sound waves to make an image of your affected area.  MRI.  How is this treated?  This condition may be treated by resting, icing, applying pressure (compression), and raising (elevating) the area above the level of your heart. This is known as RICE therapy. Treatment may also include:  Medicines to help reduce inflammation or to help reduce pain.  Exercises or physical therapy to strengthen and stretch the tendon.  A brace or splint.  Surgery (rare).  Follow these instructions at home:  Image   If you have a splint or brace:     Wear the splint or brace as told by your health care provider. Remove it only as told by your health care provider.  Loosen the splint or brace if your fingers or toes tingle, become numb, or turn cold and blue.  Do not take baths, swim, or use a hot tub until your health care provider approves. Ask your health care provider if you can take showers. You may only be allowed to take sponge baths for bathing.  Do not let your splint or brace get wet if it is not waterproof.  If your splint or brace is not waterproof, cover it with a watertight plastic bag when you take a bath or a shower.  Keep the splint or brace clean.  Managing pain, stiffness, and swelling     If directed, apply ice to the affected area.  Put ice in a plastic bag.  Place a towel between your skin and the bag.  Leave the ice on for 20 minutes, 2–3 times a day.  If directed, apply heat to the affected area as often as told by your health care provider. Use the heat source that your health care provider recommends, such as a moist heat pack or a heating pad.  Place a towel between your skin and the heat source.  Leave the heat on for 20–30 minutes.  Remove the heat if your skin turns bright red. This is especially important if you are unable to feel pain, heat, or cold. You may have a greater risk of getting burned.  Move the fingers or toes of the affected limb often, if this applies. This can help to prevent stiffness and lessen swelling.  If directed, elevate the affected area above the level of your heart while you are sitting or lying down.  Driving     Do not drive or operate heavy machinery while taking prescription pain medicine.  Ask your health care provider when it is safe to drive if you have a splint or brace on any part of your arm or leg.  Activity     Return to your normal activities as told by your health care provider. Ask your health care provider what activities are safe for you.  Rest the affected area as told by your health care provider.  Avoid using the affected area while you are experiencing symptoms of tendinitis.  Do exercises as told by your health care provider.  General instructions     If you have a splint, do not put pressure on any part of the splint until it is fully hardened. This may take several hours.  Wear an elastic bandage or compression wrap only as told by your health care provider.  Take over-the-counter and prescription medicines only as told by your health care provider.  Keep all follow-up visits as told by your health care provider. This is important.  Contact a health care provider if:  Your symptoms do not improve.  You develop new, unexplained problems, such as numbness in your hands.  This information is not intended to replace advice given to you by your health care provider. Make sure you discuss any questions you have with your health care provider.    Document Released: 12/15/2001 Document Revised: 08/17/2017 Document Reviewed: 09/19/2016  [employer] Interactive Patient Education © 2019 [employer]

## 2025-05-25 NOTE — ED PROVIDER NOTE - ATTENDING APP SHARED VISIT CONTRIBUTION OF CARE
70-year-old male past medical history noted presents for evaluation of pain to left foot that started a few weeks ago.  No trauma.  Patient states pain feels worse over the last few days.  No new issues, no ankle pain, on exam patient in NAD, AO x 3, positive tenderness along fifth metatarsal, skin is intact, positive distal pulses, no erythema, no swelling

## 2025-05-25 NOTE — ED PROVIDER NOTE - PATIENT PORTAL LINK FT
You can access the FollowMyHealth Patient Portal offered by [facility] by registering at the following website: [email or web address]. By joining [facility]’s FollowMyHealth portal, you will also be able to view your health information using other applications (apps) compatible with our system.

## 2025-05-25 NOTE — ED ADULT NURSE NOTE - NSFALLRISKINTERV_ED_ALL_ED
Assistance OOB with selected safe patient handling equipment if applicable/Assistance with ambulation/Communicate fall risk and risk factors to all staff, patient, and family/Monitor gait and stability/Provide visual cue: yellow wristband, yellow gown, etc/Reinforce activity limits and safety measures with patient and family/Call bell, personal items and telephone in reach/Instruct patient to call for assistance before getting out of bed/chair/stretcher/Non-slip footwear applied when patient is off stretcher/[place] to call system/Physically safe environment - no spills, clutter or unnecessary equipment/Purposeful Proactive Rounding/Room/bathroom lighting operational, light cord in reach

## 2025-05-25 NOTE — ED PROVIDER NOTE - IV ALTEPLASE INCLUSION HIDDEN
[Normal] : affect was normal and insight and judgment were intact [de-identified] : no jaundice show

## 2025-05-25 NOTE — ED ADULT TRIAGE NOTE - TEMPERATURE IN CELSIUS (DEGREES C)
36.7 Transposition Flap Text: The defect edges were debeveled with a #15 scalpel blade. Given the location of the defect and the proximity to free margins a transposition flap was deemed most appropriate. Using a sterile surgical marker, an appropriate transposition flap was drawn incorporating the defect. The area thus outlined was incised deep to adipose tissue with a #15 scalpel blade. The skin margins were undermined to an appropriate distance in all directions utilizing iris scissors. Following this, the designed flap was carried over into the primary defect and sutured into place.

## 2025-06-02 ENCOUNTER — EMERGENCY (EMERGENCY)
Facility: HOSPITAL | Age: 70
LOS: 0 days | Discharge: HOME | End: 2025-06-02
Attending: EMERGENCY MEDICINE
Payer: MEDICARE

## 2025-06-02 VITALS
TEMPERATURE: 98 F | DIASTOLIC BLOOD PRESSURE: 80 MMHG | SYSTOLIC BLOOD PRESSURE: 132 MMHG | HEIGHT: 69 IN | OXYGEN SATURATION: 96 % | HEART RATE: 94 BPM | WEIGHT: 246.04 LBS | RESPIRATION RATE: 18 BRPM

## 2025-06-02 VITALS — SYSTOLIC BLOOD PRESSURE: 117 MMHG | HEART RATE: 80 BPM | TEMPERATURE: 99 F | DIASTOLIC BLOOD PRESSURE: 70 MMHG

## 2025-06-02 DIAGNOSIS — I10 ESSENTIAL (PRIMARY) HYPERTENSION: ICD-10-CM

## 2025-06-02 DIAGNOSIS — E78.00 PURE HYPERCHOLESTEROLEMIA, UNSPECIFIED: ICD-10-CM

## 2025-06-02 DIAGNOSIS — Z79.82 LONG TERM (CURRENT) USE OF ASPIRIN: ICD-10-CM

## 2025-06-02 DIAGNOSIS — H11.32 CONJUNCTIVAL HEMORRHAGE, LEFT EYE: ICD-10-CM

## 2025-06-02 DIAGNOSIS — Z98.890 OTHER SPECIFIED POSTPROCEDURAL STATES: Chronic | ICD-10-CM

## 2025-06-02 DIAGNOSIS — Z88.2 ALLERGY STATUS TO SULFONAMIDES: ICD-10-CM

## 2025-06-02 DIAGNOSIS — H57.89 OTHER SPECIFIED DISORDERS OF EYE AND ADNEXA: ICD-10-CM

## 2025-06-02 DIAGNOSIS — Z98.1 ARTHRODESIS STATUS: Chronic | ICD-10-CM

## 2025-06-02 PROCEDURE — 99282 EMERGENCY DEPT VISIT SF MDM: CPT

## 2025-06-02 PROCEDURE — 99283 EMERGENCY DEPT VISIT LOW MDM: CPT

## 2025-06-02 NOTE — ED PROVIDER NOTE - ATTENDING APP SHARED VISIT CONTRIBUTION OF CARE
I have personally performed a history and physical exam on this patient and personally directed the management of the patient. Patient is a 70-year-old male presents for evaluation of left eye redness onset over the past 12 hours patient denies any cough vomiting eye trauma injury denies any visual changes patient is not wearing contacts    On physical exam patient has evidence of subconjunctival hematoma no pain with extraocular movements no evidence of hyphema visual acuity is 20/20    Assessment plan patient's exam is consistent with some conjunctival hematoma we discussed indications to return patient is advised to follow-up with Optho next 24 to 48 hours I have personally performed a history and physical exam on this patient and personally directed the management of the patient. Patient is a 70-year-old male presents for evaluation of left eye redness onset over the past 12 hours patient denies any cough vomiting eye trauma injury denies any visual changes patient is not wearing contacts.    On physical exam patient has evidence of subconjunctival hematoma no pain with extraocular movements no evidence of hyphema visual acuity is 20/20    Assessment plan patient's exam is consistent with some conjunctival hematoma we discussed indications to return patient is advised to follow-up with Optho next 24 to 48 hours

## 2025-06-02 NOTE — ED PROVIDER NOTE - CLINICAL SUMMARY MEDICAL DECISION MAKING FREE TEXT BOX
Patient is a 70-year-old male presents for evaluation of left eye redness onset over the past 12 hours patient denies any cough vomiting eye trauma injury denies any visual changes patient is not wearing contacts    On physical exam patient has evidence of subconjunctival hematoma no pain with extraocular movements no evidence of hyphema visual acuity is 20/20    Assessment plan patient's exam is consistent with some conjunctival hematoma we discussed indications to return patient is advised to follow-up with Optho next 24 to 48 hours

## 2025-06-02 NOTE — ED PROVIDER NOTE - OBJECTIVE STATEMENT
Patient is a 70-year-old male presents for evaluation of left eye redness onset over the past 12 hours patient denies any cough vomiting eye trauma injury denies any visual changes patient is not wearing contacts 70 years old male history of hypertension, high cholesterol, on daily aspirin presenting complaining of eye redness since yesterday.  Noted more redness today so he comes to the ED for evaluation.   patient otherwise denies any cough vomiting eye trauma injury denies any visual changes patient is not wearing contacts

## 2025-06-02 NOTE — ED PROVIDER NOTE - PHYSICAL EXAMINATION
CONSTITUTIONAL: Well-appearing;  in no apparent distress.   EYES: PERRL; EOM intact.  [facility] OD 20/20 OS 20/20 OU 20/20 subconjunctival hemorrhage noted to medial aspect of left eye.  No hyphema.  Cornea clear.  SKIN: No acute rash to face  NEURO/PSYCH: A & O x 4; grossly unremarkable.

## 2025-06-02 NOTE — ED PROVIDER NOTE - PATIENT PORTAL LINK FT
You can access the FollowMyHealth Patient Portal offered by [facility] by registering at the following website: [email or web address]. By joining [facility]’s FollowMyHealth portal, you will also be able to view your health information using other applications (apps) compatible with our system.

## 2025-06-12 ENCOUNTER — RX RENEWAL (OUTPATIENT)
Age: 70
End: 2025-06-12

## 2025-06-16 ENCOUNTER — NON-APPOINTMENT (OUTPATIENT)
Age: 70
End: 2025-06-16

## 2025-06-16 ENCOUNTER — APPOINTMENT (OUTPATIENT)
Dept: CARDIOLOGY | Facility: CLINIC | Age: 70
End: 2025-06-16
Payer: MEDICARE

## 2025-06-16 VITALS — HEART RATE: 77 BPM

## 2025-06-16 VITALS
HEIGHT: 69 IN | WEIGHT: 251 LBS | SYSTOLIC BLOOD PRESSURE: 129 MMHG | DIASTOLIC BLOOD PRESSURE: 78 MMHG | BODY MASS INDEX: 37.18 KG/M2

## 2025-06-16 PROCEDURE — 99213 OFFICE O/P EST LOW 20 MIN: CPT

## 2025-06-16 RX ORDER — SEMAGLUTIDE 0.68 MG/ML
2 INJECTION, SOLUTION SUBCUTANEOUS
Qty: 4 | Refills: 3 | Status: ACTIVE | COMMUNITY
Start: 2025-06-16 | End: 1900-01-01

## 2025-06-16 RX ORDER — LOSARTAN POTASSIUM AND HYDROCHLOROTHIAZIDE 12.5; 5 MG/1; MG/1
50-12.5 TABLET ORAL DAILY
Refills: 0 | Status: ACTIVE | COMMUNITY

## 2025-06-16 RX ORDER — ASPIRIN 81 MG/1
81 TABLET, COATED ORAL
Qty: 90 | Refills: 1 | Status: COMPLETED | COMMUNITY
Start: 2025-06-12 | End: 2025-06-16

## 2025-06-17 ENCOUNTER — APPOINTMENT (OUTPATIENT)
Dept: SLEEP CENTER | Facility: HOSPITAL | Age: 70
End: 2025-06-17
Payer: MEDICARE

## 2025-06-18 ENCOUNTER — OUTPATIENT (OUTPATIENT)
Dept: OUTPATIENT SERVICES | Facility: HOSPITAL | Age: 70
LOS: 1 days | Discharge: HOME | End: 2025-06-18
Payer: MEDICARE

## 2025-06-18 DIAGNOSIS — Z98.890 OTHER SPECIFIED POSTPROCEDURAL STATES: Chronic | ICD-10-CM

## 2025-06-18 DIAGNOSIS — G47.33 OBSTRUCTIVE SLEEP APNEA (ADULT) (PEDIATRIC): ICD-10-CM

## 2025-06-18 DIAGNOSIS — Z98.1 ARTHRODESIS STATUS: Chronic | ICD-10-CM

## 2025-06-18 PROCEDURE — 95800 SLP STDY UNATTENDED: CPT | Mod: 26

## 2025-06-18 PROCEDURE — 95800 SLP STDY UNATTENDED: CPT

## 2025-06-21 DIAGNOSIS — G47.33 OBSTRUCTIVE SLEEP APNEA (ADULT) (PEDIATRIC): ICD-10-CM

## 2025-07-15 ENCOUNTER — APPOINTMENT (OUTPATIENT)
Dept: CARDIOLOGY | Facility: CLINIC | Age: 70
End: 2025-07-15
Payer: MEDICARE

## 2025-07-15 VITALS
WEIGHT: 241 LBS | DIASTOLIC BLOOD PRESSURE: 84 MMHG | SYSTOLIC BLOOD PRESSURE: 120 MMHG | HEIGHT: 69 IN | BODY MASS INDEX: 35.7 KG/M2

## 2025-07-15 PROCEDURE — 93923 UPR/LXTR ART STDY 3+ LVLS: CPT

## 2025-07-15 PROCEDURE — 99213 OFFICE O/P EST LOW 20 MIN: CPT

## 2025-07-15 RX ORDER — LOSARTAN POTASSIUM 25 MG/1
25 TABLET, FILM COATED ORAL DAILY
Qty: 90 | Refills: 3 | Status: ACTIVE | COMMUNITY
Start: 2025-07-15 | End: 1900-01-01

## 2025-08-06 ENCOUNTER — APPOINTMENT (OUTPATIENT)
Dept: CARDIOLOGY | Facility: CLINIC | Age: 70
End: 2025-08-06

## 2025-08-06 DIAGNOSIS — I10 ESSENTIAL (PRIMARY) HYPERTENSION: ICD-10-CM

## 2025-08-06 DIAGNOSIS — R06.02 SHORTNESS OF BREATH: ICD-10-CM

## 2025-08-06 DIAGNOSIS — R73.03 PREDIABETES.: ICD-10-CM

## 2025-08-06 DIAGNOSIS — G47.33 OBSTRUCTIVE SLEEP APNEA (ADULT) (PEDIATRIC): ICD-10-CM

## 2025-08-06 PROCEDURE — 99212 OFFICE O/P EST SF 10 MIN: CPT | Mod: 93

## 2025-08-15 PROBLEM — G47.33 OBSTRUCTIVE SLEEP APNEA SYNDROME: Status: ACTIVE | Noted: 2025-07-15

## 2025-08-15 PROBLEM — R73.03 PRE-DIABETES: Status: ACTIVE | Noted: 2025-06-16

## 2025-09-09 ENCOUNTER — APPOINTMENT (OUTPATIENT)
Dept: CARDIOLOGY | Facility: CLINIC | Age: 70
End: 2025-09-09
Payer: MEDICARE

## 2025-09-09 DIAGNOSIS — R73.03 PREDIABETES.: ICD-10-CM

## 2025-09-09 DIAGNOSIS — E66.01 MORBID (SEVERE) OBESITY DUE TO EXCESS CALORIES: ICD-10-CM

## 2025-09-09 DIAGNOSIS — I10 ESSENTIAL (PRIMARY) HYPERTENSION: ICD-10-CM

## 2025-09-09 PROCEDURE — 99212 OFFICE O/P EST SF 10 MIN: CPT | Mod: 93

## 2025-09-09 RX ORDER — SEMAGLUTIDE 1.34 MG/ML
4 INJECTION, SOLUTION SUBCUTANEOUS
Qty: 1 | Refills: 0 | Status: ACTIVE | COMMUNITY
Start: 2025-09-09 | End: 1900-01-01